# Patient Record
Sex: FEMALE | URBAN - NONMETROPOLITAN AREA
[De-identification: names, ages, dates, MRNs, and addresses within clinical notes are randomized per-mention and may not be internally consistent; named-entity substitution may affect disease eponyms.]

---

## 2023-09-28 ENCOUNTER — OFFICE VISIT (OUTPATIENT)
Dept: URBAN - NONMETROPOLITAN AREA CLINIC 13 | Facility: CLINIC | Age: 23
End: 2023-09-28
Payer: COMMERCIAL

## 2023-09-28 VITALS
BODY MASS INDEX: 26.97 KG/M2 | SYSTOLIC BLOOD PRESSURE: 115 MMHG | DIASTOLIC BLOOD PRESSURE: 78 MMHG | WEIGHT: 152.2 LBS | HEART RATE: 80 BPM | HEIGHT: 63 IN

## 2023-09-28 DIAGNOSIS — K58.0 IRRITABLE BOWEL SYNDROME WITH DIARRHEA: ICD-10-CM

## 2023-09-28 DIAGNOSIS — R14.0 BLOATING: ICD-10-CM

## 2023-09-28 DIAGNOSIS — R19.8 ALTERED BOWEL FUNCTION: ICD-10-CM

## 2023-09-28 PROCEDURE — 99244 OFF/OP CNSLTJ NEW/EST MOD 40: CPT | Performed by: INTERNAL MEDICINE

## 2023-09-28 PROCEDURE — 99204 OFFICE O/P NEW MOD 45 MIN: CPT | Performed by: INTERNAL MEDICINE

## 2023-09-28 RX ORDER — RIFAXIMIN 550 MG/1
1 TABLET TABLET ORAL TWICE A DAY
Qty: 28 TABLET | Refills: 3 | OUTPATIENT
Start: 2023-09-28 | End: 2023-11-22

## 2023-09-28 RX ORDER — LAMOTRIGINE 100 MG/1
1 TABLET TABLET ORAL ONCE A DAY
Status: ACTIVE | COMMUNITY

## 2023-09-28 RX ORDER — SPIRONOLACTONE 100 MG/1
1 TABLET TABLET, FILM COATED ORAL ONCE A DAY
Status: ACTIVE | COMMUNITY

## 2023-09-28 RX ORDER — DICYCLOMINE HYDROCHLORIDE 10 MG/1
1 CAPSULE 30 MINUTES BEFORE EATING CAPSULE ORAL THREE TIMES A DAY
Qty: 90 | Refills: 3 | OUTPATIENT
Start: 2023-09-28 | End: 2024-01-25

## 2023-09-28 NOTE — HPI-TODAY'S VISIT:
Mally is a very pleasant 23-year-old female self-referred for altered bowel habits and bloating.  She states that ever since she was a teenager she had a lot of anxiety and associated GI symptoms.  This initially included nausea, vomiting and abdominal pain.  More recently she has had bloating, a lot of anxiety and associated GI symptoms.  This initially included nausea, vomiting and abdominal pain.  More recently she has had bloating, intestinal discomfort she works as a labor and delivery nurse at [employer] altered bowel habits: Etiology unclear but I suspect this is functional.

## 2023-09-29 ENCOUNTER — P2P PATIENT RECORD (OUTPATIENT)
Age: 23
End: 2023-09-29

## 2023-09-29 ENCOUNTER — TELEPHONE ENCOUNTER (OUTPATIENT)
Dept: URBAN - NONMETROPOLITAN AREA CLINIC 13 | Facility: CLINIC | Age: 23
End: 2023-09-29

## 2023-10-02 ENCOUNTER — TELEPHONE ENCOUNTER (OUTPATIENT)
Dept: URBAN - NONMETROPOLITAN AREA CLINIC 13 | Facility: CLINIC | Age: 23
End: 2023-10-02

## 2023-11-09 ENCOUNTER — OFFICE VISIT (OUTPATIENT)
Dept: URBAN - NONMETROPOLITAN AREA CLINIC 13 | Facility: CLINIC | Age: 23
End: 2023-11-09
Payer: COMMERCIAL

## 2023-11-09 ENCOUNTER — DASHBOARD ENCOUNTERS (OUTPATIENT)
Age: 23
End: 2023-11-09

## 2023-11-09 VITALS
DIASTOLIC BLOOD PRESSURE: 73 MMHG | WEIGHT: 157.2 LBS | HEART RATE: 67 BPM | BODY MASS INDEX: 27.85 KG/M2 | SYSTOLIC BLOOD PRESSURE: 109 MMHG | HEIGHT: 63 IN

## 2023-11-09 DIAGNOSIS — K58.0 IRRITABLE BOWEL SYNDROME WITH DIARRHEA: ICD-10-CM

## 2023-11-09 DIAGNOSIS — R14.0 BLOATING: ICD-10-CM

## 2023-11-09 DIAGNOSIS — R19.8 ALTERED BOWEL FUNCTION: ICD-10-CM

## 2023-11-09 PROBLEM — 197125005: Status: ACTIVE | Noted: 2023-09-28

## 2023-11-09 PROCEDURE — 99212 OFFICE O/P EST SF 10 MIN: CPT | Performed by: NURSE PRACTITIONER

## 2023-11-09 RX ORDER — DICYCLOMINE HYDROCHLORIDE 10 MG/1
1 CAPSULE 30 MINUTES BEFORE EATING CAPSULE ORAL THREE TIMES A DAY
Qty: 90 | Refills: 3 | Status: ACTIVE | COMMUNITY
Start: 2023-09-28 | End: 2024-01-25

## 2023-11-09 RX ORDER — SPIRONOLACTONE 100 MG/1
1 TABLET TABLET, FILM COATED ORAL ONCE A DAY
Status: ACTIVE | COMMUNITY

## 2023-11-09 RX ORDER — RIFAXIMIN 550 MG/1
1 TABLET TABLET ORAL TWICE A DAY
Qty: 28 TABLET | Refills: 3 | Status: ACTIVE | COMMUNITY
Start: 2023-09-28 | End: 2023-11-22

## 2023-11-09 RX ORDER — LAMOTRIGINE 100 MG/1
1 TABLET TABLET ORAL ONCE A DAY
Status: ACTIVE | COMMUNITY

## 2023-11-09 NOTE — HPI-OTHER HISTORIES
9/28/23: Mally is a very pleasant 23-year-old female self-referred for altered bowel habits and bloating. She states that ever since she was a teenager she had a lot of anxiety and associated GI symptoms. This initially included nausea, vomiting and abdominal pain. More recently she has had bloating, a lot of anxiety and associated GI symptoms. This initially included nausea, vomiting and abdominal pain. 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  Discussed SIBO.  Recommend dicyclomine, Xifaxan, and probiotics.  Discussed low FODMAP diet and erratic bowel habits.  She denies any blood in her stool.  She has minimal reflux.  She denies any weight loss although she has intentionally lost approximately 20 to 30 pounds over the past year.  Her reflux is gone away.  She denies any blood in her stool.

## 2023-11-09 NOTE — HPI-TODAY'S VISIT:
Mally Glez is a 23-year-old female who presents today for follow-up of altered bowel habits and bloating.  She reports that since taking a course of Xifaxan her bloating is much improved.  She has determined that she believes her alternating constipation and diarrhea is related to her menstrual cycle.  She typically has problems the week prior to and the week following her menstrual cycle.  Otherwise she has regular bowel movements but she reports that they are not fully complete.  Denies any blood in her stool.  Feels her nerves are less of a problem than they used to be in high school.  LG.

## 2024-05-09 ENCOUNTER — OFFICE VISIT (OUTPATIENT)
Dept: URBAN - NONMETROPOLITAN AREA CLINIC 13 | Facility: CLINIC | Age: 24
End: 2024-05-09